# Patient Record
Sex: FEMALE
[De-identification: names, ages, dates, MRNs, and addresses within clinical notes are randomized per-mention and may not be internally consistent; named-entity substitution may affect disease eponyms.]

---

## 2017-02-28 ENCOUNTER — APPOINTMENT (OUTPATIENT)
Dept: OPHTHALMOLOGY | Facility: CLINIC | Age: 77
End: 2017-02-28

## 2017-02-28 DIAGNOSIS — H04.123 DRY EYE SYNDROME OF BILATERAL LACRIMAL GLANDS: ICD-10-CM

## 2017-02-28 DIAGNOSIS — H40.003 PREGLAUCOMA, UNSPECIFIED, BILATERAL: ICD-10-CM

## 2022-07-19 PROBLEM — H40.003 GLAUCOMA SUSPECT OF BOTH EYES: Status: ACTIVE | Noted: 2017-02-28

## 2023-12-05 ENCOUNTER — APPOINTMENT (OUTPATIENT)
Dept: NEUROLOGY | Facility: CLINIC | Age: 83
End: 2023-12-05
Payer: MEDICARE

## 2023-12-05 VITALS
HEART RATE: 78 BPM | SYSTOLIC BLOOD PRESSURE: 188 MMHG | OXYGEN SATURATION: 96 % | BODY MASS INDEX: 26.5 KG/M2 | DIASTOLIC BLOOD PRESSURE: 79 MMHG | HEIGHT: 62 IN | WEIGHT: 144 LBS | TEMPERATURE: 97.5 F

## 2023-12-05 VITALS — SYSTOLIC BLOOD PRESSURE: 164 MMHG | DIASTOLIC BLOOD PRESSURE: 68 MMHG

## 2023-12-05 DIAGNOSIS — Z87.39 PERSONAL HISTORY OF OTHER DISEASES OF THE MUSCULOSKELETAL SYSTEM AND CONNECTIVE TISSUE: ICD-10-CM

## 2023-12-05 DIAGNOSIS — Z85.850 PERSONAL HISTORY OF MALIGNANT NEOPLASM OF THYROID: ICD-10-CM

## 2023-12-05 DIAGNOSIS — E89.0 POSTPROCEDURAL HYPOTHYROIDISM: ICD-10-CM

## 2023-12-05 DIAGNOSIS — Z86.39 PERSONAL HISTORY OF OTHER ENDOCRINE, NUTRITIONAL AND METABOLIC DISEASE: ICD-10-CM

## 2023-12-05 DIAGNOSIS — G43.109 MIGRAINE WITH AURA, NOT INTRACTABLE, W/OUT STATUS MIGRAINOSUS: ICD-10-CM

## 2023-12-05 PROCEDURE — 99205 OFFICE O/P NEW HI 60 MIN: CPT

## 2023-12-05 RX ORDER — CERTOLIZUMAB PEGOL 200 MG/ML
2 X 200 INJECTION, SOLUTION SUBCUTANEOUS
Refills: 0 | Status: ACTIVE | COMMUNITY

## 2023-12-05 RX ORDER — LEVOTHYROXINE SODIUM 0.12 MG/1
125 TABLET ORAL DAILY
Refills: 0 | Status: ACTIVE | COMMUNITY

## 2023-12-05 RX ORDER — LANSOPRAZOLE 30 MG/1
30 CAPSULE, DELAYED RELEASE ORAL DAILY
Refills: 0 | Status: ACTIVE | COMMUNITY

## 2023-12-05 RX ORDER — MULTIVIT-MIN/FOLIC/VIT K/LYCOP 400-300MCG
1000 TABLET ORAL DAILY
Refills: 0 | Status: ACTIVE | COMMUNITY

## 2023-12-05 RX ORDER — MONTELUKAST SODIUM 10 MG/1
10 TABLET, FILM COATED ORAL DAILY
Refills: 0 | Status: ACTIVE | COMMUNITY

## 2023-12-05 RX ORDER — CHLORHEXIDINE GLUCONATE 4 %
1000 LIQUID (ML) TOPICAL DAILY
Refills: 0 | Status: ACTIVE | COMMUNITY

## 2023-12-06 ENCOUNTER — APPOINTMENT (OUTPATIENT)
Dept: NEUROLOGY | Facility: CLINIC | Age: 83
End: 2023-12-06
Payer: MEDICARE

## 2023-12-06 PROCEDURE — 93886 INTRACRANIAL COMPLETE STUDY: CPT

## 2023-12-06 PROCEDURE — 93880 EXTRACRANIAL BILAT STUDY: CPT

## 2023-12-13 ENCOUNTER — OUTPATIENT (OUTPATIENT)
Dept: OUTPATIENT SERVICES | Facility: HOSPITAL | Age: 83
LOS: 1 days | End: 2023-12-13
Payer: MEDICARE

## 2023-12-13 ENCOUNTER — APPOINTMENT (OUTPATIENT)
Dept: MRI IMAGING | Facility: HOSPITAL | Age: 83
End: 2023-12-13

## 2023-12-13 PROCEDURE — 70551 MRI BRAIN STEM W/O DYE: CPT | Mod: 26,MH

## 2023-12-13 PROCEDURE — 70551 MRI BRAIN STEM W/O DYE: CPT

## 2023-12-20 ENCOUNTER — OUTPATIENT (OUTPATIENT)
Dept: OUTPATIENT SERVICES | Facility: HOSPITAL | Age: 83
LOS: 1 days | End: 2023-12-20
Payer: MEDICARE

## 2023-12-20 ENCOUNTER — RESULT REVIEW (OUTPATIENT)
Age: 83
End: 2023-12-20

## 2023-12-20 ENCOUNTER — APPOINTMENT (OUTPATIENT)
Dept: NUCLEAR MEDICINE | Facility: HOSPITAL | Age: 83
End: 2023-12-20

## 2023-12-20 LAB
GLUCOSE BLDC GLUCOMTR-MCNC: 85 MG/DL — SIGNIFICANT CHANGE UP (ref 70–99)
GLUCOSE BLDC GLUCOMTR-MCNC: 85 MG/DL — SIGNIFICANT CHANGE UP (ref 70–99)

## 2023-12-20 PROCEDURE — 78608 BRAIN IMAGING (PET): CPT | Mod: 26,MH

## 2023-12-20 PROCEDURE — 78999 UNLISTED MISC PX DX NUC MED: CPT

## 2023-12-20 PROCEDURE — 82962 GLUCOSE BLOOD TEST: CPT

## 2023-12-20 PROCEDURE — 78608 BRAIN IMAGING (PET): CPT

## 2023-12-20 PROCEDURE — 78999 UNLISTED MISC PX DX NUC MED: CPT | Mod: 26

## 2023-12-20 PROCEDURE — A9552: CPT

## 2024-01-02 ENCOUNTER — APPOINTMENT (OUTPATIENT)
Dept: NEUROLOGY | Facility: CLINIC | Age: 84
End: 2024-01-02
Payer: MEDICARE

## 2024-01-02 DIAGNOSIS — R48.2 APRAXIA: ICD-10-CM

## 2024-01-02 DIAGNOSIS — R41.4 NEUROLOGIC NEGLECT SYNDROME: ICD-10-CM

## 2024-01-02 DIAGNOSIS — G31.84 MILD COGNITIVE IMPAIRMENT, SO STATED: ICD-10-CM

## 2024-01-02 PROCEDURE — 99214 OFFICE O/P EST MOD 30 MIN: CPT

## 2024-01-02 RX ORDER — BISMUTH SUBSALICYLATE 525 MG/1
TABLET ORAL
Refills: 0 | Status: ACTIVE | COMMUNITY

## 2024-01-02 RX ORDER — ALBUTEROL SULFATE 90 UG/1
108 AEROSOL, METERED RESPIRATORY (INHALATION)
Refills: 0 | Status: ACTIVE | COMMUNITY

## 2024-01-02 NOTE — ASSESSMENT
[FreeTextEntry1] : Neuropsychological testing to be followed by possible amyloid PET and apoE genotype  Patient agrees to come back to [place] for this test.  Spoke to patients daughter Dr. Smith as well.

## 2024-01-02 NOTE — HISTORY OF PRESENT ILLNESS
[FreeTextEntry1] : Interval course: No change in functioning.  Patient had an MRI which is negative for significant stroke or mass or right parietal pathology. PET scan does show right greater than left parietofrontal decreased FDG tracer c/w neurodegenerative pathology. However, her MOCA test score was 30.   See below for full report.  Images reviewed and discussed with radiology.   CC: [id number]     EXAM:  NM BRAIN FUSION WITH MR   ORDERED BY: RENUKA HINKLE  ACC: [id number]     EXAM:  PET BRAIN FDG DEMENTIA   ORDERED BY: RENUKA HINKLE  PROCEDURE DATE:  12/20/2023    INTERPRETATION:  HISTORY: Evaluate for AD versus FTD. Progressive cognitive decline since 2023 characterized by memory loss, sensory apraxia, and neglect. Evaluate for right parietal dysfunction. MMSE score is 30 of 30.  TECHNIQUE: On December 20, 2023 patient was given 7.70 millicuries of FDG intravenously while semisupine in the resting state. Fasting blood sugar measured prior to injection of radiopharmaceutical was 85 mg/dl. Approximately one hour post injection of radiotracer, dedicated PET and CT images of the brain were obtained as per routine protocol. The CT protocol was optimized for PET attenuation correction and to provide anatomic detail for localization of PET abnormalities. 3-D reconstructions and PET-MRI fusions were performed utilizing [employer] and reviewed.  COMPARISON: MRI brain 12/13/2023.  FINDINGS:  PET FINDINGS:  Visually, there is abnormal FDG distribution pattern with moderate decreased radiotracer uptake in the parietotemporal regions, findings more pronounced on the right side, with prominent involvement of the superior parietal lobules, including the bilateral precuneus. Note, there is preserved normal tracer uptake in the posterior cingulate gyri, a region typically involved early in the course of Alzheimer's disease. There is extension of abnormal hypometabolism into the bilateral frontal lobes, indicating advanced neurodegenerative disease. There is prominent involvement of the superior and dorsolateral frontal regions, including the premotor cortex (supplementary motor area), and primary sensorimotor cortex. There is moderate hypometabolism in the cerebellum and brainstem, nonspecific finding, favored to reflect effects of neurotropic medications. There is left greater than right cerebellar hypometabolism, indicating component of crossed cerebellar diaschisis  There is preserved relatively normal brain metabolism in the primary visual cortex and subcortical structures including the basal ganglia and thalami bilaterally.  Semiquantitative analysis using the Z scores calculated in comparison to age-matched normal controls revealed significantly decreased values in the right parietal lobe, right angular gyrus, right supramarginal gyrus, bilateral superior parietal lobules, bilateral precuneus, right precentral gyrus, and right retrosplenial area.   MRI FINDINGS:  There are scattered T2/FLAIR hyperintense foci in the subcortical and periventricular white matter, nonspecific finding, favored to reflect sequelae of mild chronic microvascular ischemic disease. There is a small chronic lacunar infarct in the left cerebellar hemisphere.  There is cortical sulcal prominence related to underlying brain parenchymal volume loss, which appears particularly pronounced in the frontoparietal regions bilaterally.  No acute infarction, intracranial hemorrhage or mass.  There is no evidence of hydrocephalus. There are no extra-axial fluid collections. The skull base flow voids are present.  The patient is status post bilateral lens replacement. There are mild mucosal inflammatory changes of the ethmoid air cells. The mastoid air cells are grossly clear. The visualized soft tissues and osseous structures appear unremarkable.   IMPRESSION:  Abnormal hypometabolism particularly pronounced in the parietotemporal regions, right greater than left, with accompanying hypometabolism in the bilateral precuneus, findings consistent with underlying neurodegenerative disease, pattern most suggestive of Alzheimer's disease,, although sparing of the posterior cingulate and is atypical in AD.  Note, given involvement of the superior and dorsolateral frontal regions including the premotor cortex (supplementary motor area), possibility progressive supranuclear palsy or PSPS phenotype of corticobasal degeneration should be considered in the setting of clinical parkinsonism.  Correlation with clinical exam and neuropsychological assessment is advised.  --- End of Report ---      OFELIA VERGARA MD; Attending Radiologist This document has been electronically signed. Dec 22 2023  7:14PM   CC: Memory loss  " Loses where Im headed - lost her way  - this occurs over the last year.  Has difficulty outside - in the supermarket and even in her home. Knows what she needs to do or get.   had a Day test done by her family member and it was fine.   3 or 4 falls in the last few years.   No memory difficulties In fact has been called a couple of times to participate in [employer] but feels like she has lost a step in the last 5 years  has lots of hobbies  Lives alone but is very social. Plays cards once a week. Plays [employer].  Works with a  2/week.   h/o hyperlipidemia - cannot tolerate statins  One month ago had a negative stress test and unremarkable echo.

## 2024-01-02 NOTE — REASON FOR VISIT
[Home] : at home, [unfilled] , at the time of the visit. [Medical Office: ([facility])___] : at the medical office located in  [Patient] : the patient [This encounter was initiated by telehealth (audio with video) and converted to telephone (audio only) due to technical difficulties.] : This encounter was initiated by telehealth (audio with video) and converted to telephone (audio only) due to technical difficulties. [Follow-Up: _____] : a [unfilled] follow-up visit

## 2024-01-03 ENCOUNTER — TRANSCRIPTION ENCOUNTER (OUTPATIENT)
Age: 84
End: 2024-01-03

## 2024-01-12 ENCOUNTER — NON-APPOINTMENT (OUTPATIENT)
Age: 84
End: 2024-01-12

## 2024-01-12 ENCOUNTER — TRANSCRIPTION ENCOUNTER (OUTPATIENT)
Age: 84
End: 2024-01-12

## 2024-01-26 ENCOUNTER — TRANSCRIPTION ENCOUNTER (OUTPATIENT)
Age: 84
End: 2024-01-26

## 2024-02-01 ENCOUNTER — NON-APPOINTMENT (OUTPATIENT)
Age: 84
End: 2024-02-01